# Patient Record
Sex: FEMALE | Race: WHITE
[De-identification: names, ages, dates, MRNs, and addresses within clinical notes are randomized per-mention and may not be internally consistent; named-entity substitution may affect disease eponyms.]

---

## 2018-02-09 ENCOUNTER — HOSPITAL ENCOUNTER (EMERGENCY)
Dept: HOSPITAL 41 - JD.ED | Age: 48
Discharge: HOME | End: 2018-02-09
Payer: COMMERCIAL
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
## 2018-02-09 DIAGNOSIS — S52.612A: ICD-10-CM

## 2018-02-09 DIAGNOSIS — S52.532A: Primary | ICD-10-CM

## 2018-02-09 DIAGNOSIS — Z91.012: ICD-10-CM

## 2018-02-09 DIAGNOSIS — Z88.7: ICD-10-CM

## 2018-02-09 DIAGNOSIS — Z79.899: ICD-10-CM

## 2018-02-09 DIAGNOSIS — Z88.8: ICD-10-CM

## 2018-02-09 NOTE — CR
Left wrist: Two views of the left wrist were obtained.



Comparison: Prior left wrist study performed earlier on the same day (9:26 AM).



Distal radial fracture shows reduction to near-anatomic alignment.  Slightly 
displaced ulnar styloid avulsion fracture is noted.  Joint space narrowing is 
noted off the distal navicular bone.  Soft tissue swelling is noted.



Impression:

1.  Significantly improved reduction of previous radial fracture.

2.  Slightly displaced ulnar styloid fracture is again noted.

3.  Other incidental findings.



Diagnostic code #2
MTDD

## 2018-02-09 NOTE — EDM.PDOC
ED HPI GENERAL MEDICAL PROBLEM





- General


Chief Complaint: Upper Extremity Injury/Pain


Stated Complaint: LT WRIST INJURY


Time Seen by Provider: 02/09/18 09:23


Source of Information: Reports: Patient


History Limitations: Reports: No Limitations





- History of Present Illness


INITIAL COMMENTS - FREE TEXT/NARRATIVE: 





47-year-old female presents to the ED after slipping and falling on the ice on 

her way to work this morning. She was getting out of her vehicle when she 

slipped on the ice landing on outstretched left hand causing severe pain and 

obvious deformity of the distal radius and ulna. She denies hitting her head or 

losing consciousness. She states her legs did slip under the vehicle but she is 

able to walk post injury. Denies any pain in her ribs or back on deep 

inspiration. No nausea or vomiting. She states she has not yet eaten today. She 

is accompanied to the ED by a coworker.


Onset: Today


Onset Date: 02/09/18


Onset Time: 08:55


Duration: Minutes:


Location: Reports: Upper Extremity, Left (Left wrist.)


Quality: Reports: Ache, Throbbing


Severity: Moderate


Improves with: Reports: None


Worsens with: Reports: Movement


Context: Reports: Trauma (Slipped and fell on the ice this morning whilegetting 

out of her vehicle.).  Denies: Activity, Exercise, Lifting, Sick Contact


Associated Symptoms: Denies: Confusion, Chest Pain, Cough, cough w sputum, 

Diaphoresis, Fever/Chills, Headaches, Loss of Appetite, Malaise, Nausea/Vomiting

, Rash, Seizure, Shortness of Breath, Syncope


Treatments PTA: Reports: Other (see below) (None.)


  ** Left Arm


Pain Score (Numeric/FACES): 10





- Related Data


 Allergies











Allergy/AdvReac Type Severity Reaction Status Date / Time


 


atorvastatin calcium Allergy  unknown Verified 02/09/18 09:21





[From Lipitor]     


 


egg Allergy  unknown Verified 02/09/18 09:21


 


tetanus and diphtheria Allergy  unknown Verified 02/09/18 09:21





toxoids     





[Tetanus&Diphtheria Toxoid]     











Home Meds: 


 Home Meds





Albuterol [Ventolin HFA] 2 puff INH Q4H PRN 05/06/14 [History]


LORazepam [Ativan] 0.5 mg PO PRN 05/06/14 [History]


Metoprolol Succinate [Toprol XL] 25 mg PO DAILY 05/06/14 [History]


Pantoprazole Sodium 40 mg PO BID 05/06/14 [History]


Ranitidine [Zantac] 150 mg PO BID 05/06/14 [History]


Rosuvastatin [Crestor] 10 mg PO DAILY 05/06/14 [History]


Sertraline [Zoloft] 50 mg PO DAILY 05/06/14 [History]


Ubidecarenone [Co Q-10] 100 mg PO DAILY 05/06/14 [History]


Acetaminophen/HYDROcodone [Acetaminophen/HYDROcodone 108-2.5 MG/5 ML] 15 ml PO 

Q4H PRN #300 ml 05/09/14 [Rx]


Ondansetron [Zofran ODT] 4 mg PO Q6H PRN #30 tab.dis 05/09/14 [Rx]


oxyCODONE HCl/Acetaminophen [Percocet 5-325 mg Tablet] 1 - 2 each PO Q4H PRN #

24 tablet 02/09/18 [Rx]











Past Medical History





- Past Surgical History


GI Surgical History: Reports: Nissen Fundoplication (Proximal he 6 years ago. 

Has worked well for her with no heartburn.)





Social & Family History





- Tobacco Use


Second Hand Smoke Exposure: No





- Recreational Drug Use


Recreational Drug Use: No





- Living Situation & Occupation


Living situation: Reports: Single


Occupation: Employed





Review of Systems





- Review of Systems


Review Of Systems: See Below


Constitutional: Reports: No Symptoms


Eyes: Reports: No Symptoms


Ears: Reports: No Symptoms


Nose: Reports: No Symptoms


Mouth/Throat: Reports: No Symptoms


Respiratory: Reports: No Symptoms


Cardiovascular: Reports: No Symptoms


GI/Abdominal: Reports: No Symptoms, Other (Still takes Protonix to protect her 

stomach.)


Genitourinary: Reports: No Symptoms


Musculoskeletal: Reports: No Symptoms


Skin: Reports: No Symptoms


Neurological: Reports: No Symptoms


Psychiatric: Reports: No Symptoms





ED EXAM, GENERAL





- Physical Exam


Exam: See Below


Exam Limited By: No Limitations


General Appearance: Alert, Moderate Distress (In obvious pain and discomfort.)


Eye Exam: Bilateral Eye: Normal Inspection (No jaundice.)


Head: Atraumatic, Normocephalic, Other


Neck: Normal Inspection (No evidence of head or facial trauma.), Supple, Non-

Tender, Full Range of Motion.  No: Lymphadenopathy (L), Lymphadenopathy (R)


Respiratory/Chest: No Respiratory Distress, Lungs Clear, Normal Breath Sounds, 

No Accessory Muscle Use, Other (No pain on deep inspiration or compression of 

her ribs.)


Cardiovascular: Normal Peripheral Pulses, Regular Rate, Rhythm, No Edema, No 

Gallop, Systolic Murmur (Grade 2 pansystolic murmur heard best at the left 

lateral sternal border. There is also an early diastolic murmur suggesting both 

aortic stenosis and insufficiency.)


Peripheral Pulses: 2+: Radial (L), Radial (R)


GI/Abdominal: Normal Bowel Sounds, Soft, Non-Tender, No Organomegaly, No 

Abnormal Bruit, No Mass, Pelvis Stable


Back Exam: Normal Inspection, Full Range of Motion.  No: CVA Tenderness (L), 

CVA Tenderness (R)


Extremities: Normal Range of Motion, No Pedal Edema, Other (Inspection of the 

left wrist shows an obvious Colles' fracture with dinner fork deformity. 

Suspect both bones are fractured.)


Neurological: Alert, Oriented, CN II-XII Intact, Normal Cognition, Normal Gait


Psychiatric: Normal Affect, Normal Mood


Skin Exam: Warm, Dry, Pallor (Mild pallor.)





Course





- Vital Signs


Last Recorded V/S: 


 Last Vital Signs











Temp  36.1 C   02/09/18 10:46


 


Pulse  86   02/09/18 10:46


 


Resp  18   02/09/18 10:46


 


BP  108/61   02/09/18 10:46


 


Pulse Ox  100   02/09/18 10:46














- Orders/Labs/Meds


Orders: 


 Active Orders 24 hr











 Category Date Time Status


 


 Notify Provider [RC] ASDIRECTED Care  02/09/18 11:08 Active


 


 Oxygen Therapy [RC] ASDIRECTED Care  02/09/18 11:08 Active


 


 Pulse Oximetry [RC] ASDIRECTED Care  02/09/18 11:08 Active


 


 Vital Signs [RC] Q15M Care  02/09/18 11:08 Active


 


 Wrist 2V Lt [CR] Routine Exams  02/09/18 11:05 Taken


 


 Dextrose 5%-0.9% NaCl [Dextrose 5%-Normal Saline] 1,000 Med  02/09/18 09:30 

Active





 ml   





 IV ASDIRECTED   


 


 Lactated Ringers [Ringers, Lactated] 1,000 ml Med  02/09/18 10:40 Active





 IV ASDIRECTED   








 Medication Orders





Dextrose/Sodium Chloride (Dextrose 5%-Normal Saline)  1,000 mls @ 150 mls/hr IV 

ASDIRECTED EDWARD


   Last Admin: 02/09/18 09:41  Dose: 150 mls/hr


Lactated Ringer's (Ringers, Lactated)  1,000 mls @ 150 mls/hr IV ASDIRECTED EDWARD


   Last Admin: 02/09/18 10:40  Dose: 150 mls/hr








Labs: 


 Laboratory Tests











  02/09/18 02/09/18 02/09/18 Range/Units





  09:55 10:00 10:00 


 


WBC   7.39   (3.98-10.04)  K/mm3


 


RBC   4.56   (3.98-5.22)  M/mm3


 


Hgb   13.6   (11.2-15.7)  gm/L


 


Hct   40.8   (34.1-44.9)  %


 


MCV   89.5   (79.4-94.8)  fl


 


MCH   29.8   (25.6-32.2)  pg


 


MCHC   33.3   (32.2-35.5)  g/dl


 


RDW Std Deviation   42.2   (36.4-46.3)  fL


 


Plt Count   195   (182-369)  K/mm3


 


MPV   9.5   (9.4-12.3)  fl


 


Neutrophils % (Manual)   68 H   (40-60)  %


 


Band Neutrophils %   0   (0-10)  %


 


Lymphocytes % (Manual)   21   (20-40)  %


 


Atypical Lymphs %   3   %


 


Monocytes % (Manual)   7   (2-10)  %


 


Eosinophils % (Manual)   1   (0.7-5.8)  %


 


Basophils % (Manual)   0 L   (0.1-1.2)  


 


Platelet Estimate   Adequate   


 


Plt Morphology Comment   Normal   


 


RBC Morph Comment   Normal   


 


PT    11.4  (8.0-13.0)  SECONDS


 


INR    1.04  


 


APTT    23  (22-36)  SECONDS


 


Sodium  142    (136-145)  mEq/L


 


Potassium  3.9    (3.5-5.1)  mEq/L


 


Chloride  103    ()  mEq/L


 


Carbon Dioxide  29    (21-32)  mEq/L


 


Anion Gap  13.9    (5-15)  


 


BUN  13    (7-18)  mg/dL


 


Creatinine  0.9    (0.55-1.02)  mg/dL


 


Est Cr Clr Drug Dosing  70.83    mL/min


 


Estimated GFR (MDRD)  > 60    (>60)  mL/min


 


BUN/Creatinine Ratio  14.4    (14-18)  


 


Glucose  115 H    ()  mg/dL


 


Calcium  9.3    (8.5-10.1)  mg/dL


 


Total Bilirubin  0.5    (0.2-1.0)  mg/dL


 


AST  14 L    (15-37)  U/L


 


ALT  18    (14-59)  U/L


 


Alkaline Phosphatase  41 L    ()  U/L


 


Total Protein  7.2    (6.4-8.2)  g/dl


 


Albumin  3.9    (3.4-5.0)  g/dl


 


Globulin  3.3    gm/dL


 


Albumin/Globulin Ratio  1.2    (1-2)  











Meds: 


Medications











Generic Name Dose Route Start Last Admin





  Trade Name Freq  PRN Reason Stop Dose Admin


 


Dextrose/Sodium Chloride  1,000 mls @ 150 mls/hr  02/09/18 09:30  02/09/18 09:41





  Dextrose 5%-Normal Saline  IV   150 mls/hr





  ASDIRECTED EDWARD   Administration


 


Lactated Ringer's  1,000 mls @ 150 mls/hr  02/09/18 10:40  02/09/18 10:40





  Ringers, Lactated  IV   150 mls/hr





  ASDIRECTED EDWARD   Administration














Discontinued Medications














Generic Name Dose Route Start Last Admin





  Trade Name Freq  PRN Reason Stop Dose Admin


 


Fentanyl  Confirm  02/09/18 10:34  





  Sublimaze  Administered  02/09/18 10:35  





  Dose   





  100 mcg   





  .ROUTE   





  .STK-MED ONE   


 


Hydromorphone HCl  0.5 mg  02/09/18 09:23  02/09/18 09:43





  Dilaudid  IVPUSH  02/09/18 09:24  0.5 mg





  ONETIME ONE   Administration


 


Lidocaine HCl  Confirm  02/09/18 10:33  





  Xylocaine-Mpf 1%  Administered  02/09/18 10:34  





  Dose   





  4 mls @ as directed   





  .ROUTE   





  .STK-MED ONE   


 


Metoclopramide HCl  7.5 mg  02/09/18 09:23  02/09/18 09:42





  Reglan  IVPUSH  02/09/18 09:24  7.5 mg





  ONETIME ONE   Administration


 


Propofol  Confirm  02/09/18 10:34  





  Diprivan  20 Ml  Administered  02/09/18 10:35  





  Dose   





  200 mg   





  .ROUTE   





  .STK-MED ONE   














- Radiology Interpretation


Free Text/Narrative:: 





47-year-old female slipped and fell on the ice this morning while getting out 

of her vehicle going to work. She appears to blend of outstretched left hand 

with obvious injury to the left wrist with deformity. Clinically has a Colles' 

fracture possibly involving both the distal ulna and radius. No other injuries 

appear to occurred. Plan IV D5 normal saline at 500 mils per hour. She has not 

yet ate or drank today. She is mildly nauseated at this time. Plan Dilaudid 0.5 

mg IV and Reglan 7.5 mg IV for pain and nausea relief. X-rays of the left wrist 

to be obtained





- Re-Assessments/Exams


Free Text/Narrative Re-Assessment/Exam: 





02/09/18 09:46  3 views of the left wrist have been completed. It confirms a 

package dorsally angulated distal radius with articular surface for the most 

part intact. There is a small bony fragment off the distal ulnar styloid 

process as well. Deformity will have to be reduced.





02/09/18 11:15: He says been discussed with Dr. Sierra orthopedic surgeon and he 

did see the patient in the ED with plans to see her in clinic and possible 

surgical management next Friday when he is back in town. We had CRNA assist us 

to provide propofol and fentanyl as part of sedation to reduce her Colles' 

fracture with aid of the Chinese trap to maintain reduction until it can be 

immobilized in a sugar tong splint. Postreduction films show adequate alignment 

and returned to anatomical position and length of the radial styloid process. 

The ulnar styloid process fracture is much more visible on the postreduction 

films. Patient will be discharged to home once she recovers from the 

anesthetic. She be placed in a sling with her hand at heart level. She'll use a 

combination of Motrin 600 mg every 6 hours and Percocet 5/3/25 one or 2 tablets 

every 4-6 hours for pain relief. Dr Sierra  has arranged a follow-up appointment 

in his clinic for next Friday.





02/09/18 11:45 Patient has recovered fully from her anesthetic she is alert 

oriented and able to understand all of her discharge instructions.  Dr. Sierra's 

office is to call the patient early next week with the date and time for his 

assessment in the clinic.











Departure





- Departure


Time of Disposition: 12:01


Disposition: Home, Self-Care 01


Condition: Fair


Clinical Impression: 


Fracture of radius and ulna


Qualifiers:


 Encounter type: initial encounter Fracture type: closed Laterality: left 

Qualified Code(s): S52.92XA - Unspecified fracture of left forearm, initial 

encounter for closed fracture








- Discharge Information


Prescriptions: 


oxyCODONE HCl/Acetaminophen [Percocet 5-325 mg Tablet] 1 - 2 each PO Q4H PRN #

24 tablet


 PRN Reason: pain relief. 


Referrals: 


Tanesha Holcomb MD [Primary Care Provider] - 


Forms:  ED Department Discharge


Additional Instructions: 


Evaluation the emergency room this morning after you slipped and fell while 

getting out of her vehicle going to work this morning. Landed on outstretched 

left hand with resultant acute fracture of the distal radius and ulnar styloid 

process at your wrist. X-rays revealed 60-70 dorsal angulation of the radial 

bone fracture. You therefore underwent conscious sedation with the aid of the 

CRNA providing medication to allow us to reduce your fracture and restored to 

anatomical position. Wrist and hand are immobilized in a sugar tong Ortho-Glass 

splint and should remain this way until follow-up with Dr. Sierra--orthopedic 

surgeon next Friday, February 16 as planned. In the meantime you should keep 

her arm in a sling well up and her bout with your wrist at about heart level. 

This helps reduce swelling and throbbing pain. Ice pack over the splint for one 

half hour out of every 4 hours today and tomorrow. Pain medication is Motrin 

600 mg every 6 hours with some food. May use Percocet tabs 5/325 try one tablet 

first and if an hour and a half's pain is still not under control may take a 

second tablet. Note cannot operate a motor vehicle while taking strong pain 

medication. Usually the pain is quite intense from a broken bone for about 3 

days and then gradually improves. Note strong pain pills are constipating and 

if you have a problem with this you should start a stool softener such as 

Senokot 1 tablet twice daily or MiraLAX powder 1 scoop once daily event 

constipation.





- My Orders


Last 24 Hours: 


My Active Orders





02/09/18 09:30


Dextrose 5%-0.9% NaCl [Dextrose 5%-Normal Saline] 1,000 ml IV ASDIRECTED 





02/09/18 11:05


Wrist 2V Lt [CR] Routine 














- Assessment/Plan


Last 24 Hours: 


My Active Orders





02/09/18 09:30


Dextrose 5%-0.9% NaCl [Dextrose 5%-Normal Saline] 1,000 ml IV ASDIRECTED 





02/09/18 11:05


Wrist 2V Lt [CR] Routine

## 2018-02-09 NOTE — CR
Left wrist:  Four views of the left wrist were obtained.

 

Mildly comminuted distal radial fracture is seen with apex anterior 

angulation.  Small displaced ulnar styloid avulsion fracture is noted.

  No additional bony abnormality is seen.  Soft tissue swelling is 

present.

 

Impression:

1.  Angulated distal left radial fracture with mild comminution.

2.  Other incidental findings as noted above.

 

Diagnostic code #3

## 2018-02-09 NOTE — PCM.PREANE
Preanesthetic Assessment





- Anesthesia/Transfusion/Family Hx


Anesthesia History: Prior Anesthesia Without Reaction


Family History of Anesthesia Reaction: No


Transfusion History: No Prior Transfusion(s)


Intubation History: Unknown





- Review of Systems


General: No Symptoms


Pulmonary: No Symptoms (Asthma-LAST USED INHALER 2 WEEKS AGO.)


Cardiovascular: No Symptoms (History of beta blocker use at night: pt states 

that she can hear her heart beating and it keeps her up....therefore she takes 

the lopressor.)


Gastrointestinal: No Symptoms (/Hiatal Hernia)


Neurological: No Symptoms, Headache (Migraines-occasionally), Numbness (broken 

left wrist currently)


Other: Reports: Depression





- Physical Assessment


NPO Status Date: 02/08/18


NPO Status Time: 22:00


Pulse: 86


O2 Sat by Pulse Oximetry: 100


Respiratory Rate: 18


Blood Pressure: 108/61


Temperature: 36.1 C


Vital Signs: 





 Last Vital Signs











Temp  36.1 C   02/09/18 09:21


 


Pulse  86   02/09/18 09:21


 


Resp  18   02/09/18 09:21


 


BP  108/61   02/09/18 09:21


 


Pulse Ox  100   02/09/18 09:21











Height: 1.68 m


Weight: 58.06 kg


ASA Class: 2E


Mental Status: Alert & Oriented x3


Airway Class: Mallampati = 2


Dentition: Reports: Partial, Crown(s)


Thyro-Mental Finger Breadths: 3


Mouth Opening Finger Breadths: 3


ROM/Head Extension: Full


Lungs: Clear to Auscultation, Normal Respiratory Effort


Cardiovascular: Regular Rate, Regular Rhythm, No Murmurs





- Lab


Values: 





 Laboratory Last Values











WBC  7.39 K/mm3 (3.98-10.04)   02/09/18  10:00    


 


RBC  4.56 M/mm3 (3.98-5.22)   02/09/18  10:00    


 


Hgb  13.6 gm/L (11.2-15.7)   02/09/18  10:00    


 


Hct  40.8 % (34.1-44.9)   02/09/18  10:00    


 


MCV  89.5 fl (79.4-94.8)   02/09/18  10:00    


 


MCH  29.8 pg (25.6-32.2)   02/09/18  10:00    


 


MCHC  33.3 g/dl (32.2-35.5)   02/09/18  10:00    


 


RDW Std Deviation  42.2 fL (36.4-46.3)   02/09/18  10:00    


 


Plt Count  195 K/mm3 (182-369)   02/09/18  10:00    


 


MPV  9.5 fl (9.4-12.3)   02/09/18  10:00    








Above labs reviewed and noted and within acceptable ranges to proceed with 

scheduled procedure.





- Allergies


Allergies/Adverse Reactions: 


 Allergies











Allergy/AdvReac Type Severity Reaction Status Date / Time


 


atorvastatin calcium Allergy  unknown Verified 02/09/18 09:21





[From Lipitor]     


 


egg Allergy  unknown Verified 02/09/18 09:21


 


tetanus and diphtheria Allergy  unknown Verified 02/09/18 09:21





toxoids     





[Tetanus&Diphtheria Toxoid]     














- Anesthesia Plan


Pre-Op Medication Ordered: Beta Blocker


Beta Blocker: Metoprolol


Med Last Dose Date: 02/08/18


Med Last Dose Time: 22:30





- Acknowledgements


Anesthesia Type Planned: MAC


Pt an Appropriate Candidate for the Planned Anesthesia: Yes


Alternatives and Risks of Anesthesia Discussed w Pt/Guardian: Yes


Pt/Guardian Understands and Agrees with Anesthesia Plan: Yes





PreAnesthesia Questionnaire


Cardiovascular History: Reports: Heart Murmur


Respiratory History: Reports: Asthma





- Past Surgical History


GI Surgical History: Reports: Nissen Fundoplication





- SUBSTANCE USE


Smoking Status *Q: Never Smoker


Second Hand Smoke Exposure: No


Recreational Drug Use History: No





- HOME MEDS


Home Medications: 


 Home Meds





Albuterol [Ventolin HFA] 2 puff INH Q4H PRN 05/06/14 [History]


LORazepam [Ativan] 0.5 mg PO PRN 05/06/14 [History]


Metoprolol Succinate [Toprol XL] 25 mg PO DAILY 05/06/14 [History]


Pantoprazole Sodium 40 mg PO BID 05/06/14 [History]


Ranitidine [Zantac] 150 mg PO BID 05/06/14 [History]


Rosuvastatin [Crestor] 10 mg PO DAILY 05/06/14 [History]


Sertraline [Zoloft] 50 mg PO DAILY 05/06/14 [History]


Ubidecarenone [Co Q-10] 100 mg PO DAILY 05/06/14 [History]


Acetaminophen/HYDROcodone [Acetaminophen/HYDROcodone 108-2.5 MG/5 ML] 15 ml PO 

Q4H PRN #300 ml 05/09/14 [Rx]


Ondansetron [Zofran ODT] 4 mg PO Q6H PRN #30 tab.dis 05/09/14 [Rx]











- CURRENT (IN HOUSE) MEDS


Current Meds: 





 Current Medications





Dextrose/Sodium Chloride (Dextrose 5%-Normal Saline)  1,000 mls @ 150 mls/hr IV 

ASDIRECTED [facility]


   Last Admin: 02/09/18 09:41 Dose:  150 mls/hr





Discontinued Medications





Hydromorphone HCl (Dilaudid)  0.5 mg IVPUSH ONETIME ONE


   Stop: 02/09/18 09:24


   Last Admin: 02/09/18 09:43 Dose:  0.5 mg


Metoclopramide HCl (Reglan)  7.5 mg IVPUSH ONETIME ONE


   Stop: 02/09/18 09:24


   Last Admin: 02/09/18 09:42 Dose:  7.5 mg

## 2018-02-15 NOTE — PCM.PREANE
Preanesthetic Assessment





- Anesthesia/Transfusion/Family Hx


Anesthesia History: Prior Anesthesia Without Reaction


Family History of Anesthesia Reaction: No


Transfusion History: No Prior Transfusion(s)


Intubation History: Unknown





- Review of Systems


General: No Symptoms


Pulmonary: No Symptoms (History of Asthma: last used inhaler 3weeksn ago)


Cardiovascular: No Symptoms (Patient states that at night her heart beat is 

heard and keeps her up at night therefore the lopressor was prescribed./Patient 

has a history of a bicuspid aortic valve with heart murmur noted.)


Gastrointestinal: No Symptoms (GERD/History of hiatal hernia)


Neurological: No Symptoms (occasional motion sickness), Headache (History of 

migraines)


Other: Reports: None, Depression, Anxiety





- Physical Assessment


NPO Status Date: 02/15/18


NPO Status Time: 23:00


Pulse: 79


O2 Sat by Pulse Oximetry: 97


Respiratory Rate: 16


Blood Pressure: 134/64


Temperature: 37.6 C


Height: 1.68 m


Weight: 58 kg


ASA Class: 2


Mental Status: Alert & Oriented x3


Airway Class: Mallampati = 2


Dentition: Reports: Partial, Crown(s)


Thyro-Mental Finger Breadths: 3


Mouth Opening Finger Breadths: 3


ROM/Head Extension: Full


Lungs: Clear to Auscultation, Normal Respiratory Effort


Cardiovascular: Regular Rate, Regular Rhythm, No Murmurs





- Lab


Values: 





MRSA screen is positive.





All other labs reviewed and noted and within acceptable ranges to proceed with 

scheduled procedure.





- Imaging/EKG


Impressions: 





EKG:


Echo:Normal echocardiogram with no aortic stenosis or insufficiency. EF= 70-75%


CXR: negative





- Allergies


Allergies/Adverse Reactions: 


 Allergies











Allergy/AdvReac Type Severity Reaction Status Date / Time


 


atorvastatin calcium Allergy  unknown Verified 02/15/18 13:23





[From Lipitor]     


 


egg Allergy  unknown Verified 02/15/18 13:23


 


tetanus and diphtheria Allergy  unknown Verified 02/15/18 13:23





toxoids     





[Tetanus&Diphtheria Toxoid]     














- Anesthesia Plan


Pre-Op Medication Ordered: Beta Blocker


Beta Blocker: Metoprolol


Med Last Dose Date: 02/15/18


Med Last Dose Time: 21:00





- Acknowledgements


Anesthesia Type Planned: General Anesthesia


Pt an Appropriate Candidate for the Planned Anesthesia: Yes


Alternatives and Risks of Anesthesia Discussed w Pt/Guardian: Yes


Pt/Guardian Understands and Agrees with Anesthesia Plan: Yes





PreAnesthesia Questionnaire


Cardiovascular History: Reports: Heart Murmur


Respiratory History: Reports: Asthma





- Past Surgical History


GI Surgical History: Reports: Nissen Fundoplication (Proximal he 6 years ago. 

Has worked well for her with no heartburn.)





- SUBSTANCE USE


Smoking Status *Q: Never Smoker


Second Hand Smoke Exposure: No


Recreational Drug Use History: No





- HOME MEDS


Home Medications: 


 Home Meds





Albuterol [Ventolin HFA] 2 puff INH Q4H PRN 05/06/14 [History]


LORazepam [Ativan] 0.5 mg PO BID PRN 05/06/14 [History]


Metoprolol Succinate [Toprol XL] 25 mg PO BID 05/06/14 [History]


Ranitidine [Zantac] 150 mg PO BID 05/06/14 [History]


Rosuvastatin [Crestor] 10 mg PO DAILY 05/06/14 [History]


Ubidecarenone [Co Q-10] 100 mg PO DAILY 05/06/14 [History]


Ondansetron [Zofran ODT] 4 mg PO Q6H PRN #30 tab.dis 05/09/14 [Rx]


oxyCODONE HCl/Acetaminophen [Percocet 5-325 mg Tablet] 1 - 2 each PO Q4H PRN #

24 tablet 02/09/18 [Rx]


Sertraline HCl 100 mg PO DAILY 02/15/18 [History]


Acetaminophen/HYDROcodone [Norco 325-5 MG] 1 - 2 tab PO Q6H PRN #40 tablet 02/16 /18 [Rx]











- CURRENT (IN HOUSE) MEDS


Current Meds: 





 Current Medications





Lactated Ringer's (Ringers, Lactated)  1,000 mls @ 125 mls/hr IV ASDIRECTED EDWARD


   Stop: 02/16/18 23:00


Lidocaine/Sodium Bicarbonate (Buffered Lidocaine 1% In Ns 8.4%)  0.25 ml IDERM 

ONETIME PRN


   PRN Reason: Prior to IV Start


   Stop: 02/16/18 18:00


Sodium Chloride (Saline Flush)  10 ml FLUSH ASDIRECTED PRN


   PRN Reason: Keep Vein Open


   Stop: 02/16/18 18:00

## 2018-02-16 ENCOUNTER — HOSPITAL ENCOUNTER (OUTPATIENT)
Dept: HOSPITAL 41 - JD.SDS | Age: 48
Discharge: HOME | End: 2018-02-16
Attending: ORTHOPAEDIC SURGERY
Payer: COMMERCIAL

## 2018-02-16 DIAGNOSIS — J45.909: ICD-10-CM
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
## 2018-02-16 DIAGNOSIS — Z91.012: ICD-10-CM

## 2018-02-16 DIAGNOSIS — F32.9: ICD-10-CM

## 2018-02-16 DIAGNOSIS — K21.9: ICD-10-CM

## 2018-02-16 DIAGNOSIS — Z88.8: ICD-10-CM

## 2018-02-16 DIAGNOSIS — F41.9: ICD-10-CM

## 2018-02-16 DIAGNOSIS — E78.5: ICD-10-CM

## 2018-02-16 DIAGNOSIS — S52.552A: Primary | ICD-10-CM

## 2018-02-16 PROCEDURE — 25607 OPTX DST RD XARTC FX/EPI SEP: CPT

## 2018-02-16 PROCEDURE — 81025 URINE PREGNANCY TEST: CPT

## 2018-02-16 PROCEDURE — 76000 FLUOROSCOPY <1 HR PHYS/QHP: CPT

## 2018-02-16 PROCEDURE — C1713 ANCHOR/SCREW BN/BN,TIS/BN: HCPCS

## 2018-02-16 PROCEDURE — C1776 JOINT DEVICE (IMPLANTABLE): HCPCS

## 2018-02-16 PROCEDURE — 93005 ELECTROCARDIOGRAM TRACING: CPT

## 2018-02-16 RX ADMIN — FENTANYL CITRATE PRN MCG: 50 INJECTION, SOLUTION INTRAMUSCULAR; INTRAVENOUS at 10:25

## 2018-02-16 RX ADMIN — FENTANYL CITRATE PRN MCG: 50 INJECTION, SOLUTION INTRAMUSCULAR; INTRAVENOUS at 09:49

## 2018-02-16 NOTE — [email or web address].[email or web address]
POST ANESTHESIA ASSESSMENT





- MENTAL STATUS


Mental Status: Alert





- VITAL SIGNS


Pulse Rate: 85


SaO2: 94


Resp Rate: 8


Blood Pressure: 118/56


Temperature: 37.2 C





- RESPIRATORY


Respiratory Status: Respiratory Rate WNL, Airway Patent, O2 Saturation Stable, 

Supplemental Oxygen





- CARDIOVASCULAR


CV Status: Pulse Rate WNL, Blood Pressure Stable





- GASTROINTESTINAL


GI Status: No Symptoms





- POST OP HYDRATION


Hydration Status: Adequate & Stable

## 2018-02-16 NOTE — CR
Left wrist:  Two views of the left wrist are obtained utilizing C-arm 

device.

 

Comparison: Previous left wrist study of 02/09/18.

 

Plate and screws have been placed across previous distal radial 

fracture.  Slightly displaced ulnar styloid avulsion fracture remains.

  Fluoroscopy time given as 28.4 seconds.

 

Impression:

1.  Orthopedic fixation of previously noted distal left radial 

fracture.

2.  Slightly displaced ulnar styloid avulsion fracture is noted.

 

Diagnostic code #2

## 2018-02-19 NOTE — OR
DATE OF OPERATION:  02/16/2018

 

SURGEON:  Mason Sierra MD

 

OPERATION PERFORMED:  Open reduction and internal fixation of left extra-

articular distal radius fracture.

 

PREOPERATIVE DIAGNOSIS:

Left extra-articular distal radius fracture.

 

POSTOPERATIVE DIAGNOSIS:

Left extra-articular distal radius fracture.

 

ANESTHESIA:

Technique, general LMA with local.

 

ANESTHESIA PROVIDER:

Cassie Heart CRNA.

 

ASSISTANT:

Kim Carrasco PA-C.

 

ESTIMATED BLOOD LOSS:

Less than 5 mL.

 

COMPLICATIONS:

None.

 

CONDITION:

Stable.

 

DESCRIPTION OF PROCEDURE:

The patient was identified in the preop holding area.  Proper site was marked

and identified by the surgeon.  The patient was taken back to the operating

theater, where after adequate anesthesia, the patient's left upper extremity had

a nonsterile tourniquet applied and then was sterilely prepped and draped in the

usual sterile fashion.  OR time-out was performed.  The patient received 2 g of

IV Ancef.  The left upper extremity was exsanguinated.  Tourniquet was

insufflated to 225 mmHg.  Standard volar approach of Sreedhar incision was made.

This was taken down to the FCR tendon sheath, which was incised.  The floor of

the tendon sheath was also incised and the FCR tendon was retracted ulnarly to

protect the median nerve.  At this time, the pronator quadratus was identified

and was elevated to the ulnar side of the radius.  At this time, there was noted

to be significant metaphyseal comminution at this time.  A curette and rongeur

were used for removal of fracture hematoma.  Provisional reduction was then

performed.  A K-wire was placed through the radial styloid for provisional

fixation.  At this time, a narrow [place] Loring volar distal locking plate was then

placed and was in proper position using C-arm fluoroscopy.  There was found to

be adequate restoration of radial height, as well as volar tilt.  At this time,

the plate was fixed with 2.7 mm cortical screw to compress it against the bone.

Then starting from the ulnar side 4 locking screws were placed distally across

the fracture site and were found to be in adequate position on both AP, lateral,

and 23-degree lateral view.  At this time, 2 more nonlocking screws along with 1

locking screw were placed more proximally from the fracture, and was found to

have adequate restoration as well as fixation on both AP, lateral, and 23-degree

lateral views.  At this time, adequate saline was irrigated through the wound.

3-0 Vicryl was used for closure of the tendon sheath.  3-0 Vicryl was used

subcutaneously and a running 4-0 Monocryl was used for the skin.  The patient

had a sterile soft dressing as well as a volar slab splint applied and sent to

the PACU in stable condition.

 

DD:  02/19/2018 18:10:18

DT:  02/19/2018 18:37:05  ASHLEY

Job #:  [id number]

## 2018-02-19 NOTE — PCM.OPNOTE
- General Post-Op/Procedure Note


Date of Surgery/Procedure: 02/16/18


Operative Procedure(s): open reduction internal fixation of left distal radius 

fracture


Pre Op Diagnosis: left extra-articular distal radius fracture


Post-Op Diagnosis: Same


Anesthesia Technique: General LMA, Local


Primary Surgeon: Mason Sierra


Anesthesia Provider: Cassie Heart


Assistant: Kim Carrasco in mLs: 5


Complications: None


Condition: Good